# Patient Record
Sex: FEMALE | Race: WHITE | NOT HISPANIC OR LATINO
[De-identification: names, ages, dates, MRNs, and addresses within clinical notes are randomized per-mention and may not be internally consistent; named-entity substitution may affect disease eponyms.]

---

## 2019-02-25 ENCOUNTER — APPOINTMENT (OUTPATIENT)
Dept: INTERNAL MEDICINE | Facility: CLINIC | Age: 49
End: 2019-02-25
Payer: OTHER GOVERNMENT

## 2019-02-25 VITALS
SYSTOLIC BLOOD PRESSURE: 112 MMHG | WEIGHT: 131 LBS | BODY MASS INDEX: 23.5 KG/M2 | TEMPERATURE: 98.3 F | HEART RATE: 60 BPM | HEIGHT: 62.5 IN | DIASTOLIC BLOOD PRESSURE: 80 MMHG

## 2019-02-25 DIAGNOSIS — Z78.9 OTHER SPECIFIED HEALTH STATUS: ICD-10-CM

## 2019-02-25 DIAGNOSIS — Z80.3 FAMILY HISTORY OF MALIGNANT NEOPLASM OF BREAST: ICD-10-CM

## 2019-02-25 DIAGNOSIS — Z84.89 FAMILY HISTORY OF OTHER SPECIFIED CONDITIONS: ICD-10-CM

## 2019-02-25 DIAGNOSIS — Z80.8 FAMILY HISTORY OF MALIGNANT NEOPLASM OF OTHER ORGANS OR SYSTEMS: ICD-10-CM

## 2019-02-25 PROCEDURE — 90715 TDAP VACCINE 7 YRS/> IM: CPT

## 2019-02-25 PROCEDURE — 99386 PREV VISIT NEW AGE 40-64: CPT | Mod: 25

## 2019-02-25 PROCEDURE — 90471 IMMUNIZATION ADMIN: CPT

## 2019-02-25 NOTE — PHYSICAL EXAM
[Normal] : normal gait, coordination grossly intact, no focal deficits [Thin] : thin [de-identified] : normal bilat , no lumps [de-identified] : normal

## 2019-02-25 NOTE — HEALTH RISK ASSESSMENT
[Very Good] : ~his/her~  mood as very good [No falls in past year] : Patient reported no falls in the past year [0] : 2) Feeling down, depressed, or hopeless: Not at all (0) [Patient reported mammogram was normal] : Patient reported mammogram was normal [Patient reported PAP Smear was normal] : Patient reported PAP Smear was normal [HIV test declined] : HIV test declined [With Family] : lives with family [Employed] : employed [College] : College [] :  [# Of Children ___] : has [unfilled] children [Feels Safe at Home] : Feels safe at home [With Patient/Caregiver] : With Patient/Caregiver [Designated Healthcare Proxy] : Designated healthcare proxy [Name: ___] : Health Care Proxy's Name: [unfilled]  [Relationship: ___] : Relationship: [unfilled] [] : No [de-identified] : lali mcallister,GYN [[id number]] : 0 [MammogramDate] : 1/12 [PapSmearDate] : 1/18 [de-identified] :  [AdvancecareDate] : 2/25/19

## 2019-02-25 NOTE — REVIEW OF SYSTEMS
[Negative] : Heme/Lymph [Joint Pain] : no joint pain [Joint Stiffness] : no joint stiffness [Joint Swelling] : no joint swelling [Back Pain] : no back pain [FreeTextEntry9] : R ant chest wall pain c deep breathing... at times

## 2021-09-09 ENCOUNTER — APPOINTMENT (OUTPATIENT)
Dept: INTERNAL MEDICINE | Facility: CLINIC | Age: 51
End: 2021-09-09
Payer: OTHER GOVERNMENT

## 2021-09-09 VITALS
OXYGEN SATURATION: 98 % | HEART RATE: 54 BPM | BODY MASS INDEX: 23.14 KG/M2 | TEMPERATURE: 98.4 F | DIASTOLIC BLOOD PRESSURE: 70 MMHG | HEIGHT: 62.5 IN | WEIGHT: 129 LBS | SYSTOLIC BLOOD PRESSURE: 104 MMHG

## 2021-09-09 DIAGNOSIS — Z00.00 ENCOUNTER FOR GENERAL ADULT MEDICAL EXAMINATION W/OUT ABNORMAL FINDINGS: ICD-10-CM

## 2021-09-09 DIAGNOSIS — R92.2 INCONCLUSIVE MAMMOGRAM: ICD-10-CM

## 2021-09-09 DIAGNOSIS — L71.9 ROSACEA, UNSPECIFIED: ICD-10-CM

## 2021-09-09 DIAGNOSIS — M79.605 PAIN IN LEFT LEG: ICD-10-CM

## 2021-09-09 PROCEDURE — 36415 COLL VENOUS BLD VENIPUNCTURE: CPT

## 2021-09-09 PROCEDURE — 99072 ADDL SUPL MATRL&STAF TM PHE: CPT

## 2021-09-09 PROCEDURE — 99396 PREV VISIT EST AGE 40-64: CPT | Mod: 25

## 2021-09-09 NOTE — PHYSICAL EXAM
[Thin] : thin [Normal] : normal gait, coordination grossly intact, no focal deficits [de-identified] : normal bilat , no lumps [de-identified] : normal ROM LLE [de-identified] : facial /forehead rash [de-identified] : neg straight leg raising LLE

## 2021-09-09 NOTE — HISTORY OF PRESENT ILLNESS
[de-identified] : here for annual exam, feels well, some facial rash  x 1 year and dull pull LLE  x 2 months

## 2021-09-09 NOTE — HEALTH RISK ASSESSMENT
[Good] : ~his/her~  mood as  good [Yes] : Yes [0] : 2) Feeling down, depressed, or hopeless: Not at all (0) [PHQ-2 Negative - No further assessment needed] : PHQ-2 Negative - No further assessment needed [Patient reported mammogram was normal] : Patient reported mammogram was normal [HIV test declined] : HIV test declined [With Family] : lives with family [Employed] : employed [College] : College [] :  [# Of Children ___] : has [unfilled] children [] : No [[id number]] : 0 [MammogramDate] : 01/19

## 2021-09-09 NOTE — REVIEW OF SYSTEMS
[Negative] : Heme/Lymph [Joint Pain] : no joint pain [Joint Stiffness] : no joint stiffness [Joint Swelling] : no joint swelling [Muscle Weakness] : no muscle weakness [Muscle Pain] : muscle pain [Back Pain] : no back pain [Skin Rash] : skin rash [FreeTextEntry9] : LLE pull [de-identified] : face see HPI

## 2021-12-15 ENCOUNTER — APPOINTMENT (OUTPATIENT)
Dept: GASTROENTEROLOGY | Facility: CLINIC | Age: 51
End: 2021-12-15

## 2021-12-22 ENCOUNTER — APPOINTMENT (OUTPATIENT)
Dept: GASTROENTEROLOGY | Facility: CLINIC | Age: 51
End: 2021-12-22
Payer: OTHER GOVERNMENT

## 2021-12-22 VITALS
DIASTOLIC BLOOD PRESSURE: 62 MMHG | HEART RATE: 53 BPM | BODY MASS INDEX: 23.4 KG/M2 | WEIGHT: 130 LBS | OXYGEN SATURATION: 100 % | SYSTOLIC BLOOD PRESSURE: 108 MMHG

## 2021-12-22 DIAGNOSIS — Z12.11 ENCOUNTER FOR SCREENING FOR MALIGNANT NEOPLASM OF COLON: ICD-10-CM

## 2021-12-22 DIAGNOSIS — L29.0 PRURITUS ANI: ICD-10-CM

## 2021-12-22 DIAGNOSIS — D72.819 DECREASED WHITE BLOOD CELL COUNT, UNSPECIFIED: ICD-10-CM

## 2021-12-22 PROCEDURE — 99243 OFF/OP CNSLTJ NEW/EST LOW 30: CPT

## 2021-12-22 PROCEDURE — 99072 ADDL SUPL MATRL&STAF TM PHE: CPT

## 2021-12-22 NOTE — ASSESSMENT
[FreeTextEntry1] : Screening colonoscopy \par \par Risks (including but not limited to sedation risks, infection, bleeding, perforation, possibility of missed lesions), benefits and alternatives to procedure, including not doing the procedure, were discussed with patient. Patient understood and agreed to proceed with colonoscopy. \par Colonoscopy preparation instructions reviewed with patient.\par 2 Dulcolax two days prior to procedure + Split MiraLAX/Dulcolax preparation \par \par \par \par Pruritus ani- patient deferred rectal exam today. counseled patient on anal hygiene\par \par Leukopenia- on recent labs, repeat labs today. If persistent, will refer to hematology\par

## 2021-12-22 NOTE — PHYSICAL EXAM
[General Appearance - Alert] : alert [General Appearance - In No Acute Distress] : in no acute distress [Sclera] : the sclera and conjunctiva were normal [Outer Ear] : the ears and nose were normal in appearance [Hearing Threshold Finger Rub Not [place]] : hearing was normal [Neck Appearance] : the appearance of the neck was normal [] : no respiratory distress [Apical Impulse] : the apical impulse was normal [Abdomen Soft] : soft [Abdomen Tenderness] : non-tender [FreeTextEntry1] : deferred by patient to colonoscopy [Abnormal Walk] : normal gait [Skin Color & Pigmentation] : normal skin color and pigmentation [Cranial Nerves] : cranial nerves 2-12 were intact [Oriented To Time, Place, And Person] : oriented to person, place, and time

## 2021-12-22 NOTE — HISTORY OF PRESENT ILLNESS
[FreeTextEntry1] : 51 year F s/p csection presents for evaluation of colon cancer screening. \par \par recent labs notable for leukopenia, would like repeat testing\par \par pruritus ani intermittently x 2 years, not progressive, no otc treatment tried\par \par No prior colonoscopy\par \par Patient denies abdominal pain , n/v/heartburn, reflux, dysphagia/odynophagia, change in bowel habits, diarrhea, constipation, brbpr, melena. no weight loss.  Good appetite . Patient has daily BM, denies regular NSAID use. \par \par fam hx- negative for colon polyps, colon cancer\par \par mother dx with ca of her jaw, \par maternal aunts x 2 breast cancer\par father- brain tumor\par \par

## 2021-12-22 NOTE — REASON FOR VISIT
[Consultation] : a consultation visit [FreeTextEntry1] : Patient seen at the request of Dr. Tia Leigh for the evaluation of colon cancer screening

## 2022-04-25 ENCOUNTER — RESULT REVIEW (OUTPATIENT)
Age: 52
End: 2022-04-25

## 2022-04-27 ENCOUNTER — RESULT REVIEW (OUTPATIENT)
Age: 52
End: 2022-04-27

## 2022-04-28 ENCOUNTER — APPOINTMENT (OUTPATIENT)
Dept: GASTROENTEROLOGY | Facility: HOSPITAL | Age: 52
End: 2022-04-28
Payer: OTHER GOVERNMENT

## 2022-04-28 ENCOUNTER — RESULT REVIEW (OUTPATIENT)
Age: 52
End: 2022-04-28

## 2022-04-28 PROCEDURE — 45380 COLONOSCOPY AND BIOPSY: CPT

## 2022-05-05 ENCOUNTER — NON-APPOINTMENT (OUTPATIENT)
Age: 52
End: 2022-05-05

## 2022-09-14 ENCOUNTER — APPOINTMENT (OUTPATIENT)
Dept: INTERNAL MEDICINE | Facility: CLINIC | Age: 52
End: 2022-09-14

## 2022-09-14 VITALS
OXYGEN SATURATION: 97 % | TEMPERATURE: 97.8 F | DIASTOLIC BLOOD PRESSURE: 70 MMHG | HEIGHT: 62.5 IN | WEIGHT: 132 LBS | HEART RATE: 64 BPM | SYSTOLIC BLOOD PRESSURE: 106 MMHG | BODY MASS INDEX: 23.68 KG/M2

## 2022-09-14 PROCEDURE — 36415 COLL VENOUS BLD VENIPUNCTURE: CPT

## 2022-09-14 PROCEDURE — 99072 ADDL SUPL MATRL&STAF TM PHE: CPT

## 2022-09-14 PROCEDURE — 99213 OFFICE O/P EST LOW 20 MIN: CPT | Mod: 25

## 2022-09-14 NOTE — HISTORY OF PRESENT ILLNESS
[de-identified] : here  to discuss menopause/perimenopause, she has irregular periods in the last 1 seem to have been in August.  She has hot flashes during the day and night sweats.  Feels a little more tired and occasionally feels "down" without a reason\par Has not seen her gynecologist in a while and does not have an upcoming appointment\par There is history of cancer in the family

## 2022-09-14 NOTE — REVIEW OF SYSTEMS
[Night Sweats] : night sweats [Negative] : Genitourinary [Fever] : no fever [Chills] : no chills [FreeTextEntry2] : hot flushes [FreeTextEntry9] : R shoulder was dislocated in July

## 2022-09-14 NOTE — PHYSICAL EXAM
[No Edema] : there was no peripheral edema [Normal] : normal gait, coordination grossly intact, no focal deficits and deep tendon reflexes were 2+ and symmetric [de-identified] : R shoulder  dislocation 7/2022

## 2022-09-14 NOTE — HEALTH RISK ASSESSMENT
[Never] : Never [Yes] : Yes [0] : 2) Feeling down, depressed, or hopeless: Not at all (0) [PHQ-2 Negative - No further assessment needed] : PHQ-2 Negative - No further assessment needed [[id number]] : 0

## 2022-12-22 ENCOUNTER — APPOINTMENT (OUTPATIENT)
Dept: INTERNAL MEDICINE | Facility: CLINIC | Age: 52
End: 2022-12-22

## 2022-12-22 VITALS
BODY MASS INDEX: 22.61 KG/M2 | HEIGHT: 62.5 IN | WEIGHT: 126 LBS | DIASTOLIC BLOOD PRESSURE: 70 MMHG | HEART RATE: 56 BPM | OXYGEN SATURATION: 99 % | TEMPERATURE: 97.6 F | SYSTOLIC BLOOD PRESSURE: 104 MMHG

## 2022-12-22 DIAGNOSIS — S43.004S UNSPECIFIED DISLOCATION OF RIGHT SHOULDER JOINT, SEQUELA: ICD-10-CM

## 2022-12-22 DIAGNOSIS — N95.1 MENOPAUSAL AND FEMALE CLIMACTERIC STATES: ICD-10-CM

## 2022-12-22 DIAGNOSIS — Z00.01 ENCOUNTER FOR GENERAL ADULT MEDICAL EXAMINATION WITH ABNORMAL FINDINGS: ICD-10-CM

## 2022-12-22 PROCEDURE — 36415 COLL VENOUS BLD VENIPUNCTURE: CPT

## 2022-12-22 PROCEDURE — 99072 ADDL SUPL MATRL&STAF TM PHE: CPT

## 2022-12-22 PROCEDURE — 99396 PREV VISIT EST AGE 40-64: CPT | Mod: 25

## 2022-12-22 RX ORDER — PROGESTERONE 100 MG/1
100 CAPSULE ORAL
Refills: 0 | Status: ACTIVE | COMMUNITY
Start: 2022-12-22

## 2022-12-22 RX ORDER — ZOSTER VACCINE RECOMBINANT, ADJUVANTED 50 MCG/0.5
50 KIT INTRAMUSCULAR
Qty: 2 | Refills: 0 | Status: ACTIVE | COMMUNITY
Start: 2022-12-22 | End: 1900-01-01

## 2022-12-22 RX ORDER — ESTRADIOL 0.03 MG/D
0.03 PATCH, EXTENDED RELEASE TRANSDERMAL
Refills: 0 | Status: ACTIVE | COMMUNITY
Start: 2022-12-22

## 2022-12-22 RX ORDER — METRONIDAZOLE 7.5 MG/G
0.75 CREAM TOPICAL
Qty: 45 | Refills: 2 | Status: DISCONTINUED | COMMUNITY
Start: 2021-09-09 | End: 2022-12-22

## 2022-12-22 NOTE — REVIEW OF SYSTEMS
[Fever] : no fever [Chills] : no chills [Fatigue] : no fatigue [Night Sweats] : no night sweats [Vision Problems] : vision problems [Negative] : Heme/Lymph [FreeTextEntry3] : presbyopia [FreeTextEntry9] : R shoulder was dislocated in July, ecurrent

## 2022-12-22 NOTE — HEALTH RISK ASSESSMENT
[Very Good] : ~his/her~  mood as very good [Never] : Never [Yes] : Yes [0] : 2) Feeling down, depressed, or hopeless: Not at all (0) [PHQ-2 Negative - No further assessment needed] : PHQ-2 Negative - No further assessment needed [Patient reported mammogram was normal] : Patient reported mammogram was normal [Patient reported colonoscopy was normal] : Patient reported colonoscopy was normal [HIV test declined] : HIV test declined [With Family] : lives with family [Employed] : employed [College] : College [] :  [# Of Children ___] : has [unfilled] children [Fully functional (bathing, dressing, toileting, transferring, walking, feeding)] : Fully functional (bathing, dressing, toileting, transferring, walking, feeding) [Fully functional (using the telephone, shopping, preparing meals, housekeeping, doing laundry, using] : Fully functional and needs no help or supervision to perform IADLs (using the telephone, shopping, preparing meals, housekeeping, doing laundry, using transportation, managing medications and managing finances) [Patient/Caregiver not ready to engage] : , patient/caregiver not ready to engage [Designated Healthcare Proxy] : Designated healthcare proxy [Name: ___] : Health Care Proxy's Name: [unfilled]  [Relationship: ___] : Relationship: [unfilled] [[id number]] : 0 [Reports changes in hearing] : Reports no changes in hearing [Reports changes in vision] : Reports no changes in vision [MammogramDate] : 01/21 [ColonoscopyDate] : 04/22

## 2022-12-22 NOTE — PHYSICAL EXAM
[Thin] : thin [Normal] : normal gait, coordination grossly intact, no focal deficits [de-identified] : normal bilat , no gross lumps [de-identified] : facial /forehead rash

## 2024-05-09 ENCOUNTER — APPOINTMENT (OUTPATIENT)
Dept: INTERNAL MEDICINE | Facility: CLINIC | Age: 54
End: 2024-05-09